# Patient Record
Sex: MALE | Race: ASIAN | NOT HISPANIC OR LATINO
[De-identification: names, ages, dates, MRNs, and addresses within clinical notes are randomized per-mention and may not be internally consistent; named-entity substitution may affect disease eponyms.]

---

## 2023-04-10 ENCOUNTER — APPOINTMENT (OUTPATIENT)
Dept: NEPHROLOGY | Facility: CLINIC | Age: 48
End: 2023-04-10
Payer: COMMERCIAL

## 2023-04-10 VITALS — DIASTOLIC BLOOD PRESSURE: 90 MMHG | SYSTOLIC BLOOD PRESSURE: 130 MMHG

## 2023-04-10 VITALS
WEIGHT: 170 LBS | HEART RATE: 96 BPM | DIASTOLIC BLOOD PRESSURE: 83 MMHG | BODY MASS INDEX: 26.68 KG/M2 | SYSTOLIC BLOOD PRESSURE: 120 MMHG | HEIGHT: 67 IN

## 2023-04-10 DIAGNOSIS — Z78.9 OTHER SPECIFIED HEALTH STATUS: ICD-10-CM

## 2023-04-10 DIAGNOSIS — R79.89 OTHER SPECIFIED ABNORMAL FINDINGS OF BLOOD CHEMISTRY: ICD-10-CM

## 2023-04-10 DIAGNOSIS — Z86.59 PERSONAL HISTORY OF OTHER MENTAL AND BEHAVIORAL DISORDERS: ICD-10-CM

## 2023-04-10 DIAGNOSIS — Z86.39 PERSONAL HISTORY OF OTHER ENDOCRINE, NUTRITIONAL AND METABOLIC DISEASE: ICD-10-CM

## 2023-04-10 DIAGNOSIS — D47.2 MONOCLONAL GAMMOPATHY: ICD-10-CM

## 2023-04-10 DIAGNOSIS — Z87.19 PERSONAL HISTORY OF OTHER DISEASES OF THE DIGESTIVE SYSTEM: ICD-10-CM

## 2023-04-10 PROCEDURE — 99204 OFFICE O/P NEW MOD 45 MIN: CPT

## 2023-04-10 RX ORDER — SERTRALINE HYDROCHLORIDE 200 MG/1
200 CAPSULE ORAL DAILY
Refills: 0 | Status: ACTIVE | COMMUNITY
Start: 2023-04-10

## 2023-04-10 RX ORDER — TRAZODONE HYDROCHLORIDE 50 MG/1
50 TABLET ORAL
Qty: 30 | Refills: 1 | Status: ACTIVE | COMMUNITY
Start: 2023-04-10

## 2023-04-10 RX ORDER — ATORVASTATIN CALCIUM 20 MG/1
20 TABLET, FILM COATED ORAL DAILY
Qty: 30 | Refills: 3 | Status: ACTIVE | COMMUNITY
Start: 2023-04-10

## 2023-04-11 NOTE — ASSESSMENT
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

## 2023-04-11 NOTE — HISTORY OF PRESENT ILLNESS
[FreeTextEntry1] : asked to see by Dr Britton for elevated creatinine\par pt is s 46 yo man hx hyperlipidemia, pre- DM, OCD and anxiety, noted to have serum creatinine of 1.36 on routine BW in Feb - repeated and was 1.44 and 1.49 in March- lost about 15 lbs during this time period with diet \par has been well w/o symptoms\par no urine samples available -- pt thinks had nl one at some point\par had renal sono was nl \par just stopped fenofibrate bec of concern that can increase creat (2 weeks ago)\par not weightlifter, no creatine use , no steroid use\par no NSAIDs, no hx lithium use \par hx PPI use month of January, then probiotic for about a month for GI sx  -- but not since then\par had brief period HTN in past but resolved \par of note blood IF had faint IgG kappa light chain -- no ratio done, SPEP no M spike\par \par  \par

## 2023-04-11 NOTE — CONSULT LETTER
[Dear  ___] : Dear  [unfilled], [Consult Letter:] : I had the pleasure of evaluating your patient, [unfilled]. [Please see my note below.] : Please see my note below. [Consult Closing:] : Thank you very much for allowing me to participate in the care of this patient.  If you have any questions, please do not hesitate to contact me. [Sincerely,] : Sincerely, [FreeTextEntry3] : Renzo Ni MD, [facility]\par Division of Nephrology\par [facility]\par Associate Professor of Medicine\par [employer] \par \par \par \par \par

## 2023-04-14 LAB
ALBUMIN MFR SERPL ELPH: 57.9 %
ALBUMIN SERPL ELPH-MCNC: 5 G/DL
ALBUMIN SERPL-MCNC: 4.4 G/DL
ALBUMIN/GLOB SERPL: 1.4 RATIO
ALP BLD-CCNC: 106 U/L
ALPHA1 GLOB MFR SERPL ELPH: 3.5 %
ALPHA1 GLOB SERPL ELPH-MCNC: 0.3 G/DL
ALPHA2 GLOB MFR SERPL ELPH: 10.4 %
ALPHA2 GLOB SERPL ELPH-MCNC: 0.8 G/DL
ALT SERPL-CCNC: 38 U/L
ANACR T: NEGATIVE
ANION GAP SERPL CALC-SCNC: 15 MMOL/L
APPEARANCE: CLEAR
APTT BLD: 34.6 SEC
AST SERPL-CCNC: 30 U/L
B-GLOBULIN MFR SERPL ELPH: 13.4 %
B-GLOBULIN SERPL ELPH-MCNC: 1 G/DL
BACTERIA: NEGATIVE
BASOPHILS # BLD AUTO: 0.05 K/UL
BASOPHILS NFR BLD AUTO: 0.7 %
BILIRUB SERPL-MCNC: 0.4 MG/DL
BILIRUBIN URINE: NEGATIVE
BLOOD URINE: NEGATIVE
BUN SERPL-MCNC: 12 MG/DL
C3 SERPL-MCNC: 202 MG/DL
C4 SERPL-MCNC: 48 MG/DL
CALCIUM SERPL-MCNC: 10.1 MG/DL
CHLORIDE SERPL-SCNC: 102 MMOL/L
CO2 SERPL-SCNC: 26 MMOL/L
COLOR: NORMAL
CREAT SERPL-MCNC: 1.33 MG/DL
CREAT SPEC-SCNC: 54 MG/DL
CREAT SPEC-SCNC: 54 MG/DL
CREAT/PROT UR: NORMAL RATIO
CYSTATIN C SERPL-MCNC: 1.07 MG/L
DEPRECATED KAPPA LC FREE/LAMBDA SER: 1.06 RATIO
EGFR: 67 ML/MIN/1.73M2
EOSINOPHIL # BLD AUTO: 0.11 K/UL
EOSINOPHIL NFR BLD AUTO: 1.5 %
GAMMA GLOB FLD ELPH-MCNC: 1.1 G/DL
GAMMA GLOB MFR SERPL ELPH: 14.8 %
GFR/BSA.PRED SERPLBLD CYS-BASED-ARV: 76 ML/MIN/1.73M2
GLUCOSE QUALITATIVE U: NEGATIVE
GLUCOSE SERPL-MCNC: 104 MG/DL
HBV SURFACE AG SER QL: NONREACTIVE
HCT VFR BLD CALC: 42 %
HCV AB SER QL: NONREACTIVE
HCV S/CO RATIO: 0.09 S/CO
HGB BLD-MCNC: 13 G/DL
HYALINE CASTS: 0 /LPF
IGA SER QL IEP: 151 MG/DL
IGG SER QL IEP: 1296 MG/DL
IGM SER QL IEP: 55 MG/DL
IMM GRANULOCYTES NFR BLD AUTO: 0.5 %
INR PPP: 1.02 RATIO
INTERPRETATION SERPL IEP-IMP: NORMAL
KAPPA LC CSF-MCNC: 1.3 MG/DL
KAPPA LC SERPL-MCNC: 1.38 MG/DL
KETONES URINE: NEGATIVE
LEUKOCYTE ESTERASE URINE: NEGATIVE
LYMPHOCYTES # BLD AUTO: 2.22 K/UL
LYMPHOCYTES NFR BLD AUTO: 30.5 %
M PROTEIN SPEC IFE-MCNC: NORMAL
MAGNESIUM SERPL-MCNC: 2.2 MG/DL
MAN DIFF?: NORMAL
MCHC RBC-ENTMCNC: 27 PG
MCHC RBC-ENTMCNC: 31 GM/DL
MCV RBC AUTO: 87.1 FL
MICROALBUMIN 24H UR DL<=1MG/L-MCNC: <1.2 MG/DL
MICROALBUMIN/CREAT 24H UR-RTO: NORMAL MG/G
MICROSCOPIC-UA: NORMAL
MONOCYTES # BLD AUTO: 0.66 K/UL
MONOCYTES NFR BLD AUTO: 9.1 %
MYELOPEROXIDASE AB SER QL IA: <5 UNITS
MYELOPEROXIDASE CELLS FLD QL: NEGATIVE
NEUTROPHILS # BLD AUTO: 4.21 K/UL
NEUTROPHILS NFR BLD AUTO: 57.7 %
NITRITE URINE: NEGATIVE
PH URINE: 6.5
PHOSPHATE SERPL-MCNC: 3.8 MG/DL
PLATELET # BLD AUTO: 285 K/UL
POTASSIUM SERPL-SCNC: 4.3 MMOL/L
PROT SERPL-MCNC: 7.6 G/DL
PROT UR-MCNC: <4 MG/DL
PROTEIN URINE: NEGATIVE
PROTEINASE3 AB SER IA-ACNC: 5.8 UNITS
PROTEINASE3 AB SER-ACNC: NEGATIVE
PT BLD: 11.8 SEC
RBC # BLD: 4.82 M/UL
RBC # FLD: 13.7 %
RED BLOOD CELLS URINE: 0 /HPF
SODIUM SERPL-SCNC: 142 MMOL/L
SPECIFIC GRAVITY URINE: 1.01
SQUAMOUS EPITHELIAL CELLS: 0 /HPF
URATE SERPL-MCNC: 7.7 MG/DL
UROBILINOGEN URINE: NORMAL
WBC # FLD AUTO: 7.29 K/UL
WHITE BLOOD CELLS URINE: 0 /HPF

## 2023-04-17 ENCOUNTER — TRANSCRIPTION ENCOUNTER (OUTPATIENT)
Age: 48
End: 2023-04-17

## 2023-10-19 ENCOUNTER — APPOINTMENT (OUTPATIENT)
Dept: COLORECTAL SURGERY | Facility: CLINIC | Age: 48
End: 2023-10-19
Payer: COMMERCIAL

## 2023-10-19 ENCOUNTER — NON-APPOINTMENT (OUTPATIENT)
Age: 48
End: 2023-10-19

## 2023-10-19 VITALS
TEMPERATURE: 97.7 F | WEIGHT: 181 LBS | HEART RATE: 106 BPM | DIASTOLIC BLOOD PRESSURE: 81 MMHG | SYSTOLIC BLOOD PRESSURE: 132 MMHG | BODY MASS INDEX: 28.41 KG/M2 | HEIGHT: 67 IN

## 2023-10-19 DIAGNOSIS — Z87.19 PERSONAL HISTORY OF OTHER DISEASES OF THE DIGESTIVE SYSTEM: ICD-10-CM

## 2023-10-19 DIAGNOSIS — L21.9 SEBORRHEIC DERMATITIS, UNSPECIFIED: ICD-10-CM

## 2023-10-19 DIAGNOSIS — Z86.69 PERSONAL HISTORY OF OTHER DISEASES OF THE NERVOUS SYSTEM AND SENSE ORGANS: ICD-10-CM

## 2023-10-19 DIAGNOSIS — E78.00 PURE HYPERCHOLESTEROLEMIA, UNSPECIFIED: ICD-10-CM

## 2023-10-19 DIAGNOSIS — Z80.3 FAMILY HISTORY OF MALIGNANT NEOPLASM OF BREAST: ICD-10-CM

## 2023-10-19 DIAGNOSIS — D64.9 ANEMIA, UNSPECIFIED: ICD-10-CM

## 2023-10-19 PROCEDURE — 46600 DIAGNOSTIC ANOSCOPY SPX: CPT

## 2023-10-19 PROCEDURE — 99203 OFFICE O/P NEW LOW 30 MIN: CPT | Mod: 25

## 2023-10-19 RX ORDER — HYDROCORTISONE 25 MG/G
2.5 CREAM TOPICAL
Qty: 1 | Refills: 2 | Status: ACTIVE | COMMUNITY
Start: 2023-10-19 | End: 1900-01-01

## 2023-10-19 RX ORDER — FENOFIBRATE 160 MG/1
160 TABLET ORAL DAILY
Refills: 0 | Status: DISCONTINUED | COMMUNITY
Start: 2023-04-10 | End: 2023-10-19

## 2023-10-19 RX ORDER — HYDROCORTISONE 25 MG/G
2.5 CREAM TOPICAL
Qty: 1 | Refills: 3 | Status: DISCONTINUED | COMMUNITY
Start: 2023-10-19 | End: 2023-10-19

## 2023-11-07 ENCOUNTER — APPOINTMENT (OUTPATIENT)
Dept: COLORECTAL SURGERY | Facility: CLINIC | Age: 48
End: 2023-11-07
Payer: COMMERCIAL

## 2023-11-07 VITALS
HEART RATE: 101 BPM | BODY MASS INDEX: 28.41 KG/M2 | HEIGHT: 67 IN | RESPIRATION RATE: 16 BRPM | TEMPERATURE: 98.2 F | WEIGHT: 181 LBS | OXYGEN SATURATION: 100 % | SYSTOLIC BLOOD PRESSURE: 113 MMHG | DIASTOLIC BLOOD PRESSURE: 78 MMHG

## 2023-11-07 DIAGNOSIS — K64.8 OTHER HEMORRHOIDS: ICD-10-CM

## 2023-11-07 PROCEDURE — 46221 LIGATION OF HEMORRHOID(S): CPT

## 2025-04-03 ENCOUNTER — OFFICE (OUTPATIENT)
Dept: URBAN - METROPOLITAN AREA CLINIC 92 | Facility: CLINIC | Age: 50
Setting detail: OPHTHALMOLOGY
End: 2025-04-03
Payer: COMMERCIAL

## 2025-04-03 DIAGNOSIS — H16.223: ICD-10-CM

## 2025-04-03 DIAGNOSIS — H40.013: ICD-10-CM

## 2025-04-03 PROBLEM — H02.89 MEIBOMIAN GLAND DYSFUNCTION: Status: ACTIVE | Noted: 2025-04-03

## 2025-04-03 PROBLEM — H52.7 REFRACTIVE ERROR ; BOTH EYES: Status: ACTIVE | Noted: 2025-04-03

## 2025-04-03 PROCEDURE — 92020 GONIOSCOPY: CPT | Performed by: SPECIALIST

## 2025-04-03 PROCEDURE — 76514 ECHO EXAM OF EYE THICKNESS: CPT | Performed by: SPECIALIST

## 2025-04-03 PROCEDURE — 92083 EXTENDED VISUAL FIELD XM: CPT | Performed by: SPECIALIST

## 2025-04-03 PROCEDURE — 92004 COMPRE OPH EXAM NEW PT 1/>: CPT | Performed by: SPECIALIST

## 2025-04-03 PROCEDURE — 92250 FUNDUS PHOTOGRAPHY W/I&R: CPT | Performed by: SPECIALIST

## 2025-04-03 ASSESSMENT — KERATOMETRY
OS_K2POWER_DIOPTERS: 47.00
OS_K1POWER_DIOPTERS: 45.00
OD_K2POWER_DIOPTERS: 46.25
OD_K1POWER_DIOPTERS: 45.25
OS_AXISANGLE_DEGREES: 087
OD_AXISANGLE_DEGREES: 092

## 2025-04-03 ASSESSMENT — REFRACTION_CURRENTRX
OS_OVR_VA: 20/
OS_AXIS: 174
OS_CYLINDER: -2.25
OS_SPHERE: -3.75
OD_AXIS: 007
OD_SPHERE: -6.75
OD_OVR_VA: 20/
OD_CYLINDER: -0.50

## 2025-04-03 ASSESSMENT — REFRACTION_AUTOREFRACTION
OS_CYLINDER: +2.25
OD_CYLINDER: +0.50
OD_SPHERE: -7.75
OD_AXIS: 095
OS_AXIS: 087
OS_SPHERE: -6.50

## 2025-04-03 ASSESSMENT — CONFRONTATIONAL VISUAL FIELD TEST (CVF)
OD_FINDINGS: FULL
OS_FINDINGS: FULL

## 2025-04-03 ASSESSMENT — LID EXAM ASSESSMENTS
OS_MEIBOMITIS: LLL LUL 1+
OD_MEIBOMITIS: RLL RUL 1+

## 2025-04-03 ASSESSMENT — PACHYMETRY
OS_CT_CORRECTION: 1
OS_CT_UM: 532
OD_CT_CORRECTION: 2
OD_CT_UM: 518

## 2025-04-03 ASSESSMENT — SUPERFICIAL PUNCTATE KERATITIS (SPK)
OD_SPK: T
OS_SPK: T

## 2025-04-03 ASSESSMENT — TONOMETRY
OD_IOP_MMHG: 16
OS_IOP_MMHG: 16

## 2025-04-03 ASSESSMENT — VISUAL ACUITY
OS_BCVA: 20/70-1
OD_BCVA: 20/40-1